# Patient Record
Sex: FEMALE | Race: BLACK OR AFRICAN AMERICAN | URBAN - METROPOLITAN AREA
[De-identification: names, ages, dates, MRNs, and addresses within clinical notes are randomized per-mention and may not be internally consistent; named-entity substitution may affect disease eponyms.]

---

## 2020-03-18 ENCOUNTER — OFFICE VISIT (OUTPATIENT)
Dept: URBAN - METROPOLITAN AREA CLINIC 23 | Facility: CLINIC | Age: 55
End: 2020-03-18
Payer: COMMERCIAL

## 2020-03-18 PROCEDURE — 76514 ECHO EXAM OF EYE THICKNESS: CPT | Performed by: OPHTHALMOLOGY

## 2020-03-18 PROCEDURE — 92133 CPTRZD OPH DX IMG PST SGM ON: CPT | Performed by: OPHTHALMOLOGY

## 2020-03-18 PROCEDURE — 92004 COMPRE OPH EXAM NEW PT 1/>: CPT | Performed by: OPHTHALMOLOGY

## 2020-03-18 PROCEDURE — 92020 GONIOSCOPY: CPT | Performed by: OPHTHALMOLOGY

## 2020-03-18 RX ORDER — PREDNISOLONE ACETATE 10 MG/ML
1 % SUSPENSION/ DROPS OPHTHALMIC
Qty: 1 | Refills: 1 | Status: INACTIVE
Start: 2020-03-18 | End: 2020-09-16

## 2020-03-18 RX ORDER — LATANOPROST 50 UG/ML
0.005 % SOLUTION OPHTHALMIC
Qty: 0 | Refills: 3 | Status: INACTIVE
Start: 2020-03-18 | End: 2020-03-20

## 2020-03-18 ASSESSMENT — KERATOMETRY
OD: 43.13
OS: 43.50

## 2020-03-18 ASSESSMENT — INTRAOCULAR PRESSURE
OD: 16
OS: 21

## 2020-03-18 NOTE — IMPRESSION/PLAN
Impression: Anatomical narrow angle, bilateral: H40.033. Plan: Pt has NAG  Risk  Gonio :CORRINE     Pachs:546/540      Today's IOP :16/21 baseline Target IOP low to mid teens
(+) hx of Glaucoma : Mother Vision equal OU Last vf  will order C/D:0.7x0.75/0.8x0.75 OCT:88/66 Pt denies Sulfa Allergy // Pt denies Lung /Heart dx Pt is currently using : No Drops Plan :
1. Recommend LPI OU ; The patient was warned of signs and symptoms of angle closure glaucoma and the need for immediate follow-up. Discussed risks/benefits of laser peripheral iridotomy treatment. There is a possibility of need for additional sessions to complete LPI Discussed LPI does not lower pressure nor does it improve vision. If patient is on eye pressure reducing medication now, patient will still need to use them after LPI. A Ghost image or line in field of vision may be more evident in the bright light conditions. This usually resolves overtime. Also discussed possible need for further intervention with Pilocarpine, Iridoplasty, and/or Phaco +/- ECP if angles do not open after LPI. Patient to avoid medication with glaucoma warnings. All questions answered. Will rx Pred Q2h Day of procedure then taper, QID for 7 days then discontinue. 2. Pt agrees with plan and wishes to move forward 3. Will also start patient on latanoprost QHS OU 4. Schedule LPI (Patient will need a VF after procedure at time of follow up) Risk Level 1

## 2020-04-15 ENCOUNTER — SURGERY (OUTPATIENT)
Dept: URBAN - METROPOLITAN AREA SURGERY 15 | Facility: SURGERY | Age: 55
End: 2020-04-15
Payer: COMMERCIAL

## 2020-04-28 ENCOUNTER — OFFICE VISIT (OUTPATIENT)
Dept: URBAN - METROPOLITAN AREA CLINIC 23 | Facility: CLINIC | Age: 55
End: 2020-04-28
Payer: COMMERCIAL

## 2020-04-28 DIAGNOSIS — H40.033 ANATOMICAL NARROW ANGLE, BILATERAL: ICD-10-CM

## 2020-04-28 DIAGNOSIS — Z09 ENCNTR FOR F/U EXAM AFT TRTMT FOR COND OTH THAN MALIG NEOPLM: Primary | ICD-10-CM

## 2020-04-28 PROCEDURE — 92012 INTRM OPH EXAM EST PATIENT: CPT | Performed by: OPHTHALMOLOGY

## 2020-04-28 PROCEDURE — 92020 GONIOSCOPY: CPT | Performed by: OPHTHALMOLOGY

## 2020-04-28 ASSESSMENT — INTRAOCULAR PRESSURE
OD: 11
OS: 18

## 2020-07-09 ENCOUNTER — OFFICE VISIT (OUTPATIENT)
Dept: URBAN - METROPOLITAN AREA CLINIC 23 | Facility: CLINIC | Age: 55
End: 2020-07-09
Payer: COMMERCIAL

## 2020-07-09 DIAGNOSIS — H40.1131 PRIMARY OPEN-ANGLE GLAUCOMA, BILATERAL, MILD STAGE: Primary | ICD-10-CM

## 2020-07-09 PROCEDURE — 92083 EXTENDED VISUAL FIELD XM: CPT | Performed by: OPHTHALMOLOGY

## 2020-07-09 PROCEDURE — 92014 COMPRE OPH EXAM EST PT 1/>: CPT | Performed by: OPHTHALMOLOGY

## 2020-07-09 ASSESSMENT — INTRAOCULAR PRESSURE
OD: 17
OS: 19

## 2020-07-09 NOTE — IMPRESSION/PLAN
Impression: Primary open-angle glaucoma, bilateral, mild stage: H40.1131.
s/p LPI OU Plan: Risk  Gonio :SS 3+   Pachs:546/540      Today's IOP :   17, 19 Target IOP low to mid teens
(+) hx of Glaucoma : Mother Vision equal OU 
HVF 24-2 (07/09/2020) Full OU 
C/D:0.7x0.75/0.8x0.75 OCT:88/66 Pt denies Sulfa Allergy // Pt denies Lung /Heart dx Pt is currently using : No Drops Plan :
1. Cont Travatan OU qPM
2. Patient's IOP continues to be stable upon today's exam. Will continue medications as directed and follow closely. 3. Return in 6 months for DFE ( tech to [facility] and full dilation) with RNFL OCT. Discussed details about Glaucoma and that without proper control of pressures irreversible blindness can occur. Patient understands risks. Emphasize compliance with drop and without compliance vision loss progression can occur.

## 2021-03-30 ENCOUNTER — OFFICE VISIT (OUTPATIENT)
Dept: URBAN - METROPOLITAN AREA CLINIC 23 | Facility: CLINIC | Age: 56
End: 2021-03-30
Payer: COMMERCIAL

## 2021-03-30 PROCEDURE — 99214 OFFICE O/P EST MOD 30 MIN: CPT | Performed by: OPHTHALMOLOGY

## 2021-03-30 ASSESSMENT — INTRAOCULAR PRESSURE
OD: 18
OS: 18

## 2021-03-30 NOTE — IMPRESSION/PLAN
Impression: Primary open-angle glaucoma, bilateral, mild stage: H40.1131.
s/p LPI OU Plan: Risk  Gonio :SS 3+   Pachs:546/540      Today's IOP : 18/18 Target IOP low to mid teens
(+) hx of Glaucoma : Mother Vision equal OU 
HVF 24-2 (07/09/2020) Full OU 
C/D:0.7x0.75/0.8x0.75 OCT:88/66 Pt denies Sulfa Allergy // Pt denies Lung /Heart dx Plan :
1. Cont Travatan OU qPM
2. Patient's IOP continues to be stable upon today's exam. Will continue medications as directed and follow closely. 
3. Return in 6 months  IOP & VF

## 2021-10-12 ENCOUNTER — OFFICE VISIT (OUTPATIENT)
Dept: URBAN - METROPOLITAN AREA CLINIC 23 | Facility: CLINIC | Age: 56
End: 2021-10-12
Payer: COMMERCIAL

## 2021-10-12 PROCEDURE — 99214 OFFICE O/P EST MOD 30 MIN: CPT | Performed by: OPHTHALMOLOGY

## 2021-10-12 ASSESSMENT — INTRAOCULAR PRESSURE
OS: 18
OD: 19

## 2021-10-12 NOTE — IMPRESSION/PLAN
Impression: Primary open-angle glaucoma, bilateral, mild stage: H40.1131.
s/p LPI OU Plan: Risk  Gonio :SS 3+   Pachs:546/540      Today's IOP : 19/18 Target IOP low to mid teens
(+) hx of Glaucoma : Mother [employer] equal OU 
HVF 24-2 (OD Full OS Superior nasal changes 10/12/21 C/D:0.7x0.75/0.8x0.75 OCT:88/66 Pt denies Sulfa Allergy // Pt denies Lung /Heart dx Plan :
1. Cont Change Travatan to Lumigan OU qPM (change dud to VF scotoma) 2. Patient's IOP continues to be stable upon today's exam. V/F is starting to show changes OS
3.  Will make medication change and have patient return in 6-8 weeks for IOP

## 2021-11-24 ENCOUNTER — OFFICE VISIT (OUTPATIENT)
Dept: URBAN - METROPOLITAN AREA CLINIC 23 | Facility: CLINIC | Age: 56
End: 2021-11-24
Payer: COMMERCIAL

## 2021-11-24 PROCEDURE — 99214 OFFICE O/P EST MOD 30 MIN: CPT | Performed by: OPHTHALMOLOGY

## 2021-11-24 ASSESSMENT — INTRAOCULAR PRESSURE
OD: 17
OS: 20

## 2021-11-24 NOTE — IMPRESSION/PLAN
Impression: Primary open-angle glaucoma, bilateral, mild stage: H40.1131.
s/p LPI OU Plan: Risk  Gonio :SS 3+   Pachs:546/540      Today's IOP : 17/20 Target IOP low to mid teens
(+) hx of Glaucoma : Mother [employer] equal OU 
HVF 24-2 (OD Full OS Superior nasal changes 10/12/21 C/D:0.7x0.75/0.8x0.75 OCT:88/66 Pt denies Sulfa Allergy // Pt denies Lung /Heart dx Plan :
1. Continue Change Lumigan OU qPM (change dud to VF scotoma) to Vyzulta OU qPM 
2. V/F is starting to show changes OS
3.  Will make medication change and have patient return in 6-8 weeks for IOP Breath sounds clear and equal bilaterally.

## 2022-02-17 ENCOUNTER — OFFICE VISIT (OUTPATIENT)
Dept: URBAN - METROPOLITAN AREA CLINIC 23 | Facility: CLINIC | Age: 57
End: 2022-02-17
Payer: COMMERCIAL

## 2022-02-17 PROCEDURE — 92133 CPTRZD OPH DX IMG PST SGM ON: CPT | Performed by: OPHTHALMOLOGY

## 2022-02-17 PROCEDURE — 99213 OFFICE O/P EST LOW 20 MIN: CPT | Performed by: OPHTHALMOLOGY

## 2022-02-17 RX ORDER — TIMOLOL 5.12 MG/ML
0.5 % SOLUTION/ DROPS OPHTHALMIC
Qty: 7.5 | Refills: 3 | Status: ACTIVE
Start: 2022-02-17

## 2022-02-17 RX ORDER — BIMATOPROST 0.1 MG/ML
0.01 % SOLUTION/ DROPS OPHTHALMIC
Qty: 7.5 | Refills: 3 | Status: ACTIVE
Start: 2022-02-17

## 2022-02-17 ASSESSMENT — INTRAOCULAR PRESSURE
OD: 17
OS: 19

## 2022-02-17 NOTE — IMPRESSION/PLAN
Impression: Primary open-angle glaucoma, bilateral, mild stage: H40.1131.
-s/p LPI OU
-Vyzulta did not lower IOP as well Plan: Risk  Gonio :SS 3+   Pachs:546/540      Today's IOP : 17/19 Target IOP low to mid teens
(+) hx of Glaucoma : Mother [employer] equal OU 
HVF 24-2 (OD Full OS Superior nasal changes 10/12/21 C/D:0.7x0.75/0.8x0.75 OCT: 90/80 02/17/22 Pt denies Sulfa Allergy // Pt denies Lung /Heart dx Plan :
1. Continue Vyzulta OU qPM (change to lumigan ou qhs) 2. start Timolol ou qam
3. V/F is starting to show changes OS
4. IOP is doing well today 5.  RTC in 2-3 months for iop check with optom

## 2022-04-25 ENCOUNTER — OFFICE VISIT (OUTPATIENT)
Dept: URBAN - METROPOLITAN AREA CLINIC 23 | Facility: CLINIC | Age: 57
End: 2022-04-25
Payer: COMMERCIAL

## 2022-04-25 DIAGNOSIS — H40.1131 PRIMARY OPEN-ANGLE GLAUCOMA, BILATERAL, MILD STAGE: Primary | ICD-10-CM

## 2022-04-25 PROCEDURE — 99203 OFFICE O/P NEW LOW 30 MIN: CPT | Performed by: OPTOMETRIST

## 2022-04-25 RX ORDER — BIMATOPROST 0.1 MG/ML
0.01 % SOLUTION/ DROPS OPHTHALMIC
Qty: 7.5 | Refills: 5 | Status: ACTIVE
Start: 2022-04-25

## 2022-04-25 RX ORDER — TIMOLOL MALEATE 5 MG/ML
0.5 % SOLUTION/ DROPS OPHTHALMIC
Qty: 10 | Refills: 0 | Status: ACTIVE
Start: 2022-04-25

## 2022-04-25 ASSESSMENT — KERATOMETRY
OD: 43.25
OS: 43.38

## 2022-04-25 ASSESSMENT — INTRAOCULAR PRESSURE
OD: 18
OS: 18

## 2022-04-25 NOTE — IMPRESSION/PLAN
Impression: Primary open-angle glaucoma, bilateral, mild stage: H40.1131. Plan: Pt edu on all findings. Continue Lumigan QHS OU and was sent o [facility]. on file. D/c Betimol and start Timolol QAM. Rx Timolol sent to [facility] pharmacy on file. Advised patient to call with any issues. RTC 3-4 months IOP check.

## 2022-09-07 ENCOUNTER — OFFICE VISIT (OUTPATIENT)
Dept: URBAN - METROPOLITAN AREA CLINIC 23 | Facility: CLINIC | Age: 57
End: 2022-09-07
Payer: COMMERCIAL

## 2022-09-07 DIAGNOSIS — H40.1131 PRIMARY OPEN-ANGLE GLAUCOMA, BILATERAL, MILD STAGE: Primary | ICD-10-CM

## 2022-09-07 PROCEDURE — 99213 OFFICE O/P EST LOW 20 MIN: CPT | Performed by: OPTOMETRIST

## 2022-09-07 ASSESSMENT — INTRAOCULAR PRESSURE
OS: 17
OD: 17

## 2022-09-07 NOTE — IMPRESSION/PLAN
Impression: Primary open-angle glaucoma, bilateral, mild stage: H40.1131. Plan: IOP stable. No changes to current treatment.  Continue Lumigan QHS OU and Timolol  QAM.

## 2023-09-13 ENCOUNTER — OFFICE VISIT (OUTPATIENT)
Dept: URBAN - METROPOLITAN AREA CLINIC 23 | Facility: CLINIC | Age: 58
End: 2023-09-13
Payer: COMMERCIAL

## 2023-09-13 DIAGNOSIS — H40.1131 PRIMARY OPEN-ANGLE GLAUCOMA, BILATERAL, MILD STAGE: Primary | ICD-10-CM

## 2023-09-13 PROCEDURE — 92133 CPTRZD OPH DX IMG PST SGM ON: CPT | Performed by: OPTOMETRIST

## 2023-09-13 PROCEDURE — 99213 OFFICE O/P EST LOW 20 MIN: CPT | Performed by: OPTOMETRIST

## 2023-09-13 ASSESSMENT — KERATOMETRY
OS: 41.38
OD: 41.88

## 2025-06-19 ENCOUNTER — OFFICE VISIT (OUTPATIENT)
Dept: URBAN - METROPOLITAN AREA CLINIC 23 | Facility: CLINIC | Age: 60
End: 2025-06-19
Payer: COMMERCIAL

## 2025-06-19 DIAGNOSIS — H40.1131 PRIMARY OPEN-ANGLE GLAUCOMA, BILATERAL, MILD STAGE: Primary | ICD-10-CM

## 2025-06-19 PROCEDURE — 99213 OFFICE O/P EST LOW 20 MIN: CPT | Performed by: OPTOMETRIST

## 2025-06-19 PROCEDURE — 92133 CPTRZD OPH DX IMG PST SGM ON: CPT | Performed by: OPTOMETRIST

## 2025-06-19 RX ORDER — BIMATOPROST 0.1 MG/ML
0.01 % SOLUTION/ DROPS OPHTHALMIC
Qty: 7.5 | Refills: 5 | Status: ACTIVE
Start: 2025-06-19

## 2025-06-19 RX ORDER — TIMOLOL MALEATE OPHTHALMIC 5 MG/ML
0.5 % SOLUTION/ DROPS OPHTHALMIC
Qty: 15 | Refills: 3 | Status: ACTIVE
Start: 2025-06-19

## 2025-06-19 ASSESSMENT — INTRAOCULAR PRESSURE
OD: 18
OS: 18

## 2025-06-19 ASSESSMENT — KERATOMETRY
OS: 43.38
OD: 43.63